# Patient Record
Sex: FEMALE | Race: WHITE | NOT HISPANIC OR LATINO | Employment: FULL TIME | URBAN - METROPOLITAN AREA
[De-identification: names, ages, dates, MRNs, and addresses within clinical notes are randomized per-mention and may not be internally consistent; named-entity substitution may affect disease eponyms.]

---

## 2021-09-30 ENCOUNTER — HOSPITAL ENCOUNTER (OUTPATIENT)
Dept: CT IMAGING | Facility: HOSPITAL | Age: 56
Discharge: HOME | End: 2021-09-30
Admitting: SURGERY

## 2021-09-30 ENCOUNTER — TRANSCRIBE ORDERS (OUTPATIENT)
Dept: ADMINISTRATIVE | Facility: HOSPITAL | Age: 56
End: 2021-09-30

## 2021-09-30 DIAGNOSIS — R10.84 GENERALIZED ABDOMINAL PAIN: ICD-10-CM

## 2021-09-30 DIAGNOSIS — K57.92 DIVERTICULITIS: ICD-10-CM

## 2021-09-30 DIAGNOSIS — K57.92 DIVERTICULITIS: Primary | ICD-10-CM

## 2021-09-30 LAB — CREAT BLDA-MCNC: 0.8 MG/DL (ref 0.6–1.3)

## 2021-09-30 PROCEDURE — 25010000002 IOPAMIDOL 61 % SOLUTION: Performed by: SURGERY

## 2021-09-30 PROCEDURE — 0 DIATRIZOATE MEGLUMINE & SODIUM PER 1 ML: Performed by: SURGERY

## 2021-09-30 PROCEDURE — 82565 ASSAY OF CREATININE: CPT

## 2021-09-30 PROCEDURE — 74177 CT ABD & PELVIS W/CONTRAST: CPT

## 2021-09-30 RX ADMIN — IOPAMIDOL 85 ML: 612 INJECTION, SOLUTION INTRAVENOUS at 15:25

## 2021-09-30 RX ADMIN — DIATRIZOATE MEGLUMINE AND DIATRIZOATE SODIUM 30 ML: 660; 100 LIQUID ORAL; RECTAL at 15:25

## 2021-10-22 RX ORDER — CALCIUM ACETATE 667 MG/1
1334 CAPSULE ORAL DAILY
COMMUNITY
End: 2021-10-22

## 2021-10-22 RX ORDER — FUROSEMIDE 20 MG/1
20 TABLET ORAL DAILY
COMMUNITY

## 2021-10-22 RX ORDER — ASPIRIN 81 MG/1
81 TABLET ORAL
COMMUNITY
End: 2021-10-22

## 2021-10-22 RX ORDER — LEVOTHYROXINE SODIUM 0.1 MG/1
100 TABLET ORAL DAILY
COMMUNITY

## 2021-10-22 RX ORDER — ESTRADIOL 2 MG/1
2 TABLET ORAL DAILY
COMMUNITY

## 2021-10-22 RX ORDER — BUPROPION HYDROCHLORIDE 75 MG/1
75 TABLET ORAL DAILY
COMMUNITY

## 2021-10-22 RX ORDER — ROSUVASTATIN CALCIUM 10 MG/1
10 TABLET, COATED ORAL DAILY
COMMUNITY

## 2021-10-22 RX ORDER — METHYLPREDNISOLONE 4 MG
500 TABLET, DOSE PACK ORAL 3 TIMES DAILY
COMMUNITY
End: 2021-10-22

## 2021-10-22 RX ORDER — DIPHENHYDRAMINE HYDROCHLORIDE 25 MG/1
10 TABLET ORAL DAILY
COMMUNITY

## 2021-10-25 ENCOUNTER — ANESTHESIA EVENT (OUTPATIENT)
Dept: GASTROENTEROLOGY | Facility: HOSPITAL | Age: 56
End: 2021-10-25

## 2021-10-25 ENCOUNTER — HOSPITAL ENCOUNTER (OUTPATIENT)
Facility: HOSPITAL | Age: 56
Setting detail: HOSPITAL OUTPATIENT SURGERY
Discharge: HOME | End: 2021-10-25
Attending: SURGERY | Admitting: SURGERY

## 2021-10-25 ENCOUNTER — ANESTHESIA (OUTPATIENT)
Dept: GASTROENTEROLOGY | Facility: HOSPITAL | Age: 56
End: 2021-10-25

## 2021-10-25 VITALS
HEIGHT: 65 IN | TEMPERATURE: 97.8 F | SYSTOLIC BLOOD PRESSURE: 131 MMHG | BODY MASS INDEX: 28.32 KG/M2 | RESPIRATION RATE: 16 BRPM | OXYGEN SATURATION: 97 % | WEIGHT: 170 LBS | DIASTOLIC BLOOD PRESSURE: 84 MMHG | HEART RATE: 76 BPM

## 2021-10-25 PROCEDURE — 25010000002 PROPOFOL 10 MG/ML EMULSION: Performed by: ANESTHESIOLOGY

## 2021-10-25 RX ORDER — PROMETHAZINE HYDROCHLORIDE 25 MG/1
25 TABLET ORAL ONCE AS NEEDED
Status: DISCONTINUED | OUTPATIENT
Start: 2021-10-25 | End: 2021-10-25 | Stop reason: HOSPADM

## 2021-10-25 RX ORDER — PROMETHAZINE HYDROCHLORIDE 25 MG/1
25 SUPPOSITORY RECTAL ONCE AS NEEDED
Status: DISCONTINUED | OUTPATIENT
Start: 2021-10-25 | End: 2021-10-25 | Stop reason: SDUPTHER

## 2021-10-25 RX ORDER — LIDOCAINE HYDROCHLORIDE 20 MG/ML
INJECTION, SOLUTION INFILTRATION; PERINEURAL AS NEEDED
Status: DISCONTINUED | OUTPATIENT
Start: 2021-10-25 | End: 2021-10-25 | Stop reason: SURG

## 2021-10-25 RX ORDER — SODIUM CHLORIDE, SODIUM LACTATE, POTASSIUM CHLORIDE, CALCIUM CHLORIDE 600; 310; 30; 20 MG/100ML; MG/100ML; MG/100ML; MG/100ML
1000 INJECTION, SOLUTION INTRAVENOUS CONTINUOUS
Status: DISCONTINUED | OUTPATIENT
Start: 2021-10-25 | End: 2021-10-25 | Stop reason: HOSPADM

## 2021-10-25 RX ORDER — PROPOFOL 10 MG/ML
VIAL (ML) INTRAVENOUS CONTINUOUS PRN
Status: DISCONTINUED | OUTPATIENT
Start: 2021-10-25 | End: 2021-10-25 | Stop reason: SURG

## 2021-10-25 RX ORDER — PROMETHAZINE HYDROCHLORIDE 25 MG/1
25 SUPPOSITORY RECTAL ONCE AS NEEDED
Status: DISCONTINUED | OUTPATIENT
Start: 2021-10-25 | End: 2021-10-25 | Stop reason: HOSPADM

## 2021-10-25 RX ORDER — SODIUM CHLORIDE 0.9 % (FLUSH) 0.9 %
10 SYRINGE (ML) INJECTION AS NEEDED
Status: DISCONTINUED | OUTPATIENT
Start: 2021-10-25 | End: 2021-10-25 | Stop reason: HOSPADM

## 2021-10-25 RX ORDER — PROMETHAZINE HYDROCHLORIDE 25 MG/1
25 TABLET ORAL ONCE AS NEEDED
Status: DISCONTINUED | OUTPATIENT
Start: 2021-10-25 | End: 2021-10-25 | Stop reason: SDUPTHER

## 2021-10-25 RX ORDER — LIDOCAINE HYDROCHLORIDE 10 MG/ML
0.5 INJECTION, SOLUTION INFILTRATION; PERINEURAL ONCE AS NEEDED
Status: DISCONTINUED | OUTPATIENT
Start: 2021-10-25 | End: 2021-10-25 | Stop reason: HOSPADM

## 2021-10-25 RX ADMIN — PROPOFOL 200 MCG/KG/MIN: 10 INJECTION, EMULSION INTRAVENOUS at 09:18

## 2021-10-25 RX ADMIN — SODIUM CHLORIDE, POTASSIUM CHLORIDE, SODIUM LACTATE AND CALCIUM CHLORIDE 1000 ML: 600; 310; 30; 20 INJECTION, SOLUTION INTRAVENOUS at 08:38

## 2021-10-25 RX ADMIN — LIDOCAINE HYDROCHLORIDE 60 MG: 20 INJECTION, SOLUTION INFILTRATION; PERINEURAL at 09:17

## 2021-10-25 NOTE — ANESTHESIA PREPROCEDURE EVALUATION
Anesthesia Evaluation     NPO Solid Status: > 8 hours             Airway   Mallampati: II  TM distance: >3 FB  Neck ROM: full  Dental - normal exam     Pulmonary - normal exam   Cardiovascular - normal exam    (+) hyperlipidemia,   (-) past MI      Neuro/Psych  (+) psychiatric history Anxiety,     GI/Hepatic/Renal/Endo      Musculoskeletal     Abdominal    Substance History      OB/GYN          Other                        Anesthesia Plan    ASA 2     MAC       Anesthetic plan, all risks, benefits, and alternatives have been provided, discussed and informed consent has been obtained with: patient.

## 2021-10-25 NOTE — H&P
Patient  Name BRENDAN FLANAGAN (56yo, F) ID# [id number]    1965 [address]  Provider FEDERICA REID MD  Insurance   Med Primary: [employer] (POS)  Insurance # : [id number]  Prescription: [facility] - Member is eligible. details    Chief Complaint  diverticulitis/diverticulosis    Vitals  AF/VSS  Ht: 5 ft 5 in   Wt: 180 lbs   BMI: 30    Allergies  Reviewed Allergies  SULFA (SULFONAMIDE ANTIBIOTICS)     Medications  Reviewed Medications  Crestor  21   entered Federica Reid MD  Lasix 20 mg tablet  Take 1 tablet(s) every day by oral route.  21   entered Federica Reid MD  Synthroid 75 mcg tablet  Take 1 tablet(s) every day by oral route.  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 She has had some GI issues in the past (irritable bowel syndrome?) And has had colonoscopies in the past (history of polyps?) But recently had her first episode of acute sigmoid diverticulitis. She apparently started a new job last month and within a week of starting she developed crampy left lower quadrant and pelvic pain that became rather severe  and ultimately constant. She has a history of E. coli colitis that was concerning so she went to the ER. ER evaluation included a CT scan that showed acute uncomplicated sigmoid diverticulitis associated with a leukocytosis of 14.6. She was admitted for 2 days of IV abx and then sent home when her white blood cell count normalized and she felt better. She had diarrhea that persists but her bowels are trying to get back to normal. She had poor appetite and malaise that have improved and normalized. She denies blood per above or below. She denies weight loss. She has no family history of GI disease or cancer.    ROS  Patient reports no chest pain and no palpitations; no syncope. She reports abdominal pain, frequent diarrhea, dyspepsia, and GERD but reports no nausea, no vomiting, no constipation, and normal appetite. She reports arthralgias/joint pain and neck pain but reports no back pain. She reports easy bruising and excessive bleeding but reports no swollen glands. She reports no fever, no significant weight loss, and no chills. She reports no vision change. She reports no difficulty hearing. She reports no sore throat. She reports no wheezing and no shortness of breath. She reports no difficulty swallowing and no blood in stools. She reports no incontinence, no difficulty urinating, no hematuria, and no increased frequency. She reports no jaundice and no rashes. She reports no numbness, no dizziness, no headaches, and no tremor. She reports no fatigue.    Physical Exam  Patient is a 55-year-old female.    Constitutional: General Appearance: healthy-appearing.    Eyes: Pupils: PERRLA.    Neck: Neck: supple.    Lungs: Auscultation: breath sounds normal.    Cardiovascular: Heart Auscultation: RRR.    Abdomen: Inspection and Palpation: no masses or tenderness (no guarding, no rebound) and soft and non-distended.    Musculoskeletal:: Joints, Bones, and Muscles: normal movement of all extremities.    Assessment /  "Plan  Patient presents with uncomplicated diverticulitis now resolved after both inpatient and outpatient treatment. She has no history of diverticulitis in the past. She has been told she had other things such as colitis and irritable bowel? Based on abnormal CT scan and her questionable past history, I recommended colonoscopy when she is over this episode to definitively identify her condition moving forward and come up with a treatment plan and regimen to minimize its effects if at all possible. Plan was discussed, questions were answered and she is agreeable.    ADDENDUM (9/30): Pt called with recurrent c/o abd pain and bloating. CT ordered (to r/o complication or recurrent dz) showed \"significant\" diverticulosis but no problems. Plan \"stay the course\" until cscope, encouraged bland/soft diet for now.    1. Obesity  E66.9: Obesity, unspecified  DIET WEIGHT LOSS    2. Diverticulitis of sigmoid colon  K57.32: Diverticulitis of large intestine without perforation or abscess without bleeding    3. CT of abdomen abnormal  R93.5: Abnormal findings on diagnostic imaging of other abdominal regions, including retroperitoneum      Return to Office  Patient will return to the office as needed.  Amendment Sign-Off  Encounter signed-off by George Reid MD  "

## 2021-10-25 NOTE — DISCHARGE INSTRUCTIONS
For the next 24 hours patient needs to be with a responsible adult.    For 24 hours DO NOT drive, operate machinery, appliances, drink alcohol, make important decisions or sign legal documents.    Start with a light or bland diet if you are feeling sick to your stomach otherwise advance to regular diet as tolerated.    Follow recommendations on procedure report if provided by your doctor.    Call Dr DUTTA for problems [phone]    Problems may include but not limited to: large amounts of bleeding, trouble breathing, repeated vomiting, severe unrelieved pain, fever or chills.

## 2021-10-25 NOTE — ANESTHESIA POSTPROCEDURE EVALUATION
Patient: Heather Hall    Procedure Summary     Date: 10/25/21 Room / Location:  [facility]    Anesthesia Start: 0913 Anesthesia Stop: 0934    Procedure: COLONOSCOPY INTO CECUM (N/A ) Diagnosis:     Surgeons: George Reid MD Provider: Amandeep Rodriguez MD    Anesthesia Type: MAC ASA Status: 2          Anesthesia Type: MAC    Vitals  Vitals Value Taken Time   /84 10/25/21 0954   Temp 36.6 °C (97.8 °F) 10/25/21 0944   Pulse 76 10/25/21 0954   Resp 16 10/25/21 0954   SpO2 97 % 10/25/21 0954           Post Anesthesia Care and Evaluation    Patient location during evaluation: bedside  Pain management: adequate  Airway patency: patent  Anesthetic complications: No anesthetic complications    Cardiovascular status: acceptable  Respiratory status: acceptable  Hydration status: acceptable

## (undated) DEVICE — KT ORCA ORCAPOD DISP STRL

## (undated) DEVICE — TUBING, SUCTION, 1/4" X 10', STRAIGHT: Brand: MEDLINE

## (undated) DEVICE — CANN O2 ETCO2 FITS ALL CONN CO2 SMPL A/ 7IN DISP LF

## (undated) DEVICE — LN SMPL CO2 SHTRM SD STREAM W/M LUER

## (undated) DEVICE — SENSR O2 OXIMAX FNGR A/ 18IN NONSTR

## (undated) DEVICE — ADAPT CLN BIOGUARD AIR/H2O DISP